# Patient Record
Sex: FEMALE | Race: WHITE | Employment: OTHER | URBAN - METROPOLITAN AREA
[De-identification: names, ages, dates, MRNs, and addresses within clinical notes are randomized per-mention and may not be internally consistent; named-entity substitution may affect disease eponyms.]

---

## 2021-02-19 ENCOUNTER — OFFICE VISIT (OUTPATIENT)
Dept: URGENT CARE | Facility: URGENT CARE | Age: 76
End: 2021-02-19
Payer: COMMERCIAL

## 2021-02-19 ENCOUNTER — ANCILLARY PROCEDURE (OUTPATIENT)
Dept: GENERAL RADIOLOGY | Facility: CLINIC | Age: 76
End: 2021-02-19
Attending: PHYSICIAN ASSISTANT
Payer: COMMERCIAL

## 2021-02-19 VITALS
SYSTOLIC BLOOD PRESSURE: 143 MMHG | HEIGHT: 63 IN | WEIGHT: 140 LBS | HEART RATE: 69 BPM | DIASTOLIC BLOOD PRESSURE: 71 MMHG | TEMPERATURE: 97.3 F | BODY MASS INDEX: 24.8 KG/M2
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
## 2021-02-19 DIAGNOSIS — S69.92XA WRIST INJURY, LEFT, INITIAL ENCOUNTER: ICD-10-CM

## 2021-02-19 DIAGNOSIS — S52.352A CLOSED DISPLACED COMMINUTED FRACTURE OF SHAFT OF LEFT RADIUS, INITIAL ENCOUNTER: Primary | ICD-10-CM

## 2021-02-19 PROCEDURE — 99204 OFFICE O/P NEW MOD 45 MIN: CPT | Performed by: PHYSICIAN ASSISTANT

## 2021-02-19 PROCEDURE — 73110 X-RAY EXAM OF WRIST: CPT | Mod: LT | Performed by: RADIOLOGY

## 2021-02-19 RX ORDER — MULTIVITAMIN,THER AND MINERALS
TABLET ORAL
COMMUNITY
End: 2021-02-19

## 2021-02-19 RX ORDER — IBUPROFEN 200 MG
500 CAPSULE ORAL 2 TIMES DAILY
COMMUNITY

## 2021-02-19 RX ORDER — MULTIPLE VITAMINS W/ MINERALS TAB 9MG-400MCG
1 TAB ORAL DAILY
COMMUNITY

## 2021-02-19 RX ORDER — UBIDECARENONE 100 MG
100 CAPSULE ORAL DAILY
COMMUNITY

## 2021-02-19 RX ORDER — CHLORAL HYDRATE 500 MG
1 CAPSULE ORAL
COMMUNITY

## 2021-02-19 RX ORDER — ASCORBIC ACID 500 MG
1000 TABLET ORAL DAILY
COMMUNITY

## 2021-02-19 RX ORDER — LISINOPRIL 20 MG/1
20 TABLET ORAL DAILY
COMMUNITY
Start: 2020-12-14

## 2021-02-19 RX ORDER — SODIUM PHOSPHATE,MONO-DIBASIC 19G-7G/118
1 ENEMA (ML) RECTAL 2 TIMES DAILY
COMMUNITY

## 2021-02-19 RX ORDER — HYDROCODONE BITARTRATE AND ACETAMINOPHEN 5; 325 MG/1; MG/1
.5-1 TABLET ORAL EVERY 6 HOURS PRN
Qty: 12 TABLET | Refills: 0 | Status: SHIPPED | OUTPATIENT
Start: 2021-02-19 | End: 2021-02-22

## 2021-02-19 ASSESSMENT — MIFFLIN-ST. JEOR: SCORE: 1105.2

## 2021-02-19 NOTE — PATIENT INSTRUCTIONS
[facility] [place]   [address] [place] [address]  Phone: [phone]  Dr. Gotti   Check in at 1245 for 1pm appointment

## 2021-02-19 NOTE — PROGRESS NOTES
"    Assessment & Plan     Wrist injury, left, initial encounter  Wrist xray positive for wrist fracture, requiring surgery  Vicodin for pain  - XR Wrist Left G/E 3 Views  - HYDROcodone-acetaminophen (NORCO) 5-325 MG tablet; Take 0.5-1 tablets by mouth every 6 hours as needed for severe pain    Fall due to slipping on ice or snow, initial encounter  Fall in ice  Wrist fracture    Closed displaced comminuted fracture of shaft of left radius, initial encounter  Colles splint and sling  Vicodin for pain, for fracture  - Wrist/Arm/Hand Supplies Order for DME - ONLY FOR DME  - HYDROcodone-acetaminophen (NORCO) 5-325 MG tablet; Take 0.5-1 tablets by mouth every 6 hours as needed for severe pain  Referral and appt scheduled for surgery for patient       BMI:   Estimated body mass index is 25 kg/m  as calculated from the following:    Height as of this encounter: 1.594 m (5' 2.75\").    Weight as of this encounter: 63.5 kg (140 lb).       CONSULTATION/REFERRAL to [facility] for wrist fracture surgery    Orion Hernandez PA-C  [facility] [place] is a 75 year old who presents for the following health issues     HPI     Fall on ice  Deformity of left wrist    Review of Systems   Constitutional, HEENT, cardiovascular, pulmonary, gi and gu systems are negative, except as otherwise noted.      Objective    BP (!) 143/71   Pulse 69   Temp 97.3  F (36.3  C) (Temporal)   Ht 1.594 m (5' 2.75\")   Wt 63.5 kg (140 lb)   BMI 25.00 kg/m    Body mass index is 25 kg/m .  Physical Exam   GENERAL: healthy, alert and no distress  RESP: lungs clear to auscultation - no rales, rhonchi or wheezes  CV: regular rate and rhythm, normal S1 S2, no S3 or S4, no murmur, click or rub, no peripheral edema and peripheral pulses strong  MS: Positive for left wrist deformity, swelling  SKIN: Positive for swelling left wrist  NEURO: Normal strength and tone, mentation intact and speech normal.  Normal sensation of finger " intact  VASC: Vasculature intact, cap refill < 1 sec all digits  PSYCH: mentation appears normal, affect normal/bright    Xray Positive for impacted fracture with mild displacement

## 2021-03-08 ENCOUNTER — IMMUNIZATION (OUTPATIENT)
Dept: NURSING | Facility: CLINIC | Age: 76
End: 2021-03-08
Payer: COMMERCIAL

## 2021-03-08 PROCEDURE — 91300 PR COVID VAC PFIZER DIL RECON 30 MCG/0.3 ML IM: CPT

## 2021-03-08 PROCEDURE — 0001A PR COVID VAC PFIZER DIL RECON 30 MCG/0.3 ML IM: CPT

## 2021-03-29 ENCOUNTER — IMMUNIZATION (OUTPATIENT)
Dept: NURSING | Facility: CLINIC | Age: 76
End: 2021-03-29
Attending: INTERNAL MEDICINE
Payer: COMMERCIAL

## 2021-03-29 PROCEDURE — 0002A PR COVID VAC PFIZER DIL RECON 30 MCG/0.3 ML IM: CPT

## 2021-03-29 PROCEDURE — 91300 PR COVID VAC PFIZER DIL RECON 30 MCG/0.3 ML IM: CPT
